# Patient Record
Sex: MALE | Race: OTHER | NOT HISPANIC OR LATINO | URBAN - METROPOLITAN AREA
[De-identification: names, ages, dates, MRNs, and addresses within clinical notes are randomized per-mention and may not be internally consistent; named-entity substitution may affect disease eponyms.]

---

## 2020-12-01 ENCOUNTER — OUTPATIENT (OUTPATIENT)
Dept: OUTPATIENT SERVICES | Facility: HOSPITAL | Age: 45
LOS: 1 days | End: 2020-12-01

## 2020-12-01 VITALS
SYSTOLIC BLOOD PRESSURE: 132 MMHG | HEIGHT: 68 IN | DIASTOLIC BLOOD PRESSURE: 80 MMHG | OXYGEN SATURATION: 98 % | WEIGHT: 179.9 LBS | TEMPERATURE: 98 F | HEART RATE: 71 BPM | RESPIRATION RATE: 16 BRPM

## 2020-12-01 DIAGNOSIS — Z01.818 ENCOUNTER FOR OTHER PREPROCEDURAL EXAMINATION: ICD-10-CM

## 2020-12-01 DIAGNOSIS — S46.212A STRAIN OF MUSCLE, FASCIA AND TENDON OF OTHER PARTS OF BICEPS, LEFT ARM, INITIAL ENCOUNTER: ICD-10-CM

## 2020-12-01 DIAGNOSIS — Z98.890 OTHER SPECIFIED POSTPROCEDURAL STATES: Chronic | ICD-10-CM

## 2020-12-01 PROBLEM — Z00.00 ENCOUNTER FOR PREVENTIVE HEALTH EXAMINATION: Status: ACTIVE | Noted: 2020-12-01

## 2020-12-01 NOTE — H&P PST ADULT - NEGATIVE CARDIOVASCULAR SYMPTOMS
no paroxysmal nocturnal dyspnea/no peripheral edema/no chest pain/no palpitations/no dyspnea on exertion

## 2020-12-01 NOTE — H&P PST ADULT - NEGATIVE NEUROLOGICAL SYMPTOMS
no paresthesias/no headache/no difficulty walking/no weakness/no generalized seizures/no tremors/no transient paralysis/no syncope

## 2020-12-01 NOTE — H&P PST ADULT - NEGATIVE ENMT SYMPTOMS
no sinus symptoms/no dysphagia/no hearing difficulty/no ear pain/no tinnitus/no vertigo/no nose bleeds/no throat pain

## 2020-12-01 NOTE — H&P PST ADULT - ASSESSMENT
strain of muscle, fascia and tendon of other part Problem: strain of muscle, fascia and tendon of other part  Assessment and Plan: Pt is tentatively scheduled for left elbow biceps reconstruction for 12/7/20. Pre-op instructions provided. Pt given verbal and written instructions with teach back on chlorhexidine shampoo and pepcid. Pt has a scheduled preop COVID test. Pt verbalized understanding with return demonstration.     PCN allergy OR booking notified

## 2020-12-01 NOTE — H&P PST ADULT - RS GEN PE MLT RESP DETAILS PC
clear to auscultation bilaterally/no rhonchi/airway patent/respirations non-labored/breath sounds equal/good air movement/no rales/no wheezes

## 2020-12-01 NOTE — H&P PST ADULT - ENERGY EXPENDITURE (METS)
[de-identified] : headache [[email or web address]] : 16yo male to clinic with headache, started this morning, ate breakfast, no n/v, no head trauma, did not take any meds in morning. Pt was fine yesterday, pt reports getting enough sleep.\par Pt also with runny nose, no cough, deneis any seasonal allergies. \par \par  6

## 2020-12-01 NOTE — H&P PST ADULT - HISTORY OF PRESENT ILLNESS
45 year old male presents to presurgical testing with diagnosis of strain of muscle, fascia and tendon of other part scheduled for left elbow biceps reconstruction. Pt with a left bicep tendon tear while attempting to open window in April 2020.

## 2020-12-01 NOTE — H&P PST ADULT - NEGATIVE OPHTHALMOLOGIC SYMPTOMS
no diplopia/no photophobia/no loss of vision R/no pain L/no blurred vision L/no pain R/no loss of vision L/no blurred vision R

## 2020-12-01 NOTE — H&P PST ADULT - MUSCULOSKELETAL
details… detailed exam no joint erythema/no joint warmth/no calf tenderness/no joint swelling/decreased ROM due to pain

## 2020-12-01 NOTE — H&P PST ADULT - NSICDXPASTMEDICALHX_GEN_ALL_CORE_FT
PAST MEDICAL HISTORY:  Strain of muscle, fascia and tendon of other parts of biceps, left arm, initial encounter

## 2020-12-02 ENCOUNTER — APPOINTMENT (OUTPATIENT)
Dept: DISASTER EMERGENCY | Facility: CLINIC | Age: 45
End: 2020-12-02

## 2020-12-03 LAB — SARS-COV-2 N GENE NPH QL NAA+PROBE: NOT DETECTED

## 2020-12-04 VITALS
SYSTOLIC BLOOD PRESSURE: 128 MMHG | DIASTOLIC BLOOD PRESSURE: 68 MMHG | WEIGHT: 179.9 LBS | OXYGEN SATURATION: 98 % | HEIGHT: 68 IN | TEMPERATURE: 98 F | HEART RATE: 72 BPM | RESPIRATION RATE: 18 BRPM

## 2020-12-06 ENCOUNTER — TRANSCRIPTION ENCOUNTER (OUTPATIENT)
Age: 45
End: 2020-12-06

## 2020-12-07 ENCOUNTER — OUTPATIENT (OUTPATIENT)
Dept: OUTPATIENT SERVICES | Facility: HOSPITAL | Age: 45
LOS: 1 days | Discharge: HOME | End: 2020-12-07

## 2020-12-07 VITALS
DIASTOLIC BLOOD PRESSURE: 71 MMHG | OXYGEN SATURATION: 94 % | HEART RATE: 64 BPM | SYSTOLIC BLOOD PRESSURE: 115 MMHG | RESPIRATION RATE: 17 BRPM

## 2020-12-07 DIAGNOSIS — Z98.890 OTHER SPECIFIED POSTPROCEDURAL STATES: Chronic | ICD-10-CM

## 2020-12-07 DIAGNOSIS — S46.212A STRAIN OF MUSCLE, FASCIA AND TENDON OF OTHER PARTS OF BICEPS, LEFT ARM, INITIAL ENCOUNTER: ICD-10-CM

## 2020-12-07 RX ORDER — OXYCODONE HYDROCHLORIDE 5 MG/1
1 TABLET ORAL
Qty: 30 | Refills: 0
Start: 2020-12-07 | End: 2020-12-11

## 2020-12-07 RX ORDER — SODIUM CHLORIDE 9 MG/ML
1000 INJECTION, SOLUTION INTRAVENOUS
Refills: 0 | Status: DISCONTINUED | OUTPATIENT
Start: 2020-12-07 | End: 2020-12-21

## 2021-10-24 NOTE — ASU PREOP CHECKLIST - DENTURES
On medications,  no chest pain, stable, monitored and followed up by primary team/cardiology team no